# Patient Record
Sex: FEMALE | Race: ASIAN | NOT HISPANIC OR LATINO | URBAN - METROPOLITAN AREA
[De-identification: names, ages, dates, MRNs, and addresses within clinical notes are randomized per-mention and may not be internally consistent; named-entity substitution may affect disease eponyms.]

---

## 2019-12-18 ENCOUNTER — INPATIENT (INPATIENT)
Facility: HOSPITAL | Age: 39
LOS: 2 days | Discharge: HOME | End: 2019-12-21
Attending: OBSTETRICS & GYNECOLOGY | Admitting: OBSTETRICS & GYNECOLOGY

## 2019-12-18 VITALS
TEMPERATURE: 98 F | HEART RATE: 77 BPM | SYSTOLIC BLOOD PRESSURE: 119 MMHG | DIASTOLIC BLOOD PRESSURE: 70 MMHG | RESPIRATION RATE: 18 BRPM

## 2019-12-18 DIAGNOSIS — O26.899 OTHER SPECIFIED PREGNANCY RELATED CONDITIONS, UNSPECIFIED TRIMESTER: ICD-10-CM

## 2019-12-18 DIAGNOSIS — Z3A.00 WEEKS OF GESTATION OF PREGNANCY NOT SPECIFIED: ICD-10-CM

## 2019-12-18 NOTE — OB PROVIDER TRIAGE NOTE - NSHPPHYSICALEXAM_GEN_ALL_CORE
Vital Signs Last 24 Hrs  T(C): 36.5 (18 Dec 2019 21:56), Max: 36.5 (18 Dec 2019 21:56)  T(F): 97.7 (18 Dec 2019 21:56), Max: 97.7 (18 Dec 2019 21:56)  HR: 84 (18 Dec 2019 23:39) (77 - 84)  BP: 112/60 (18 Dec 2019 23:39) (112/60 - 119/70)  RR: 18 (18 Dec 2019 21:56) (18 - 18)    Assessment reveals VSS  A&Ox3  Abdomen soft, NT, Gravid   VE: 4/80/-2, intact  Cat 1 tracing, ctx q4-5mins

## 2019-12-18 NOTE — OB PROVIDER TRIAGE NOTE - HISTORY OF PRESENT ILLNESS
38y/o  @38.2wks presents with painful ctx since this morning. Pain scale 8/10.   Reports good fetal movement  Denies LOF/VB    Allergies: Denies  Medications: PNV    Denies Medical and Surgical HX  Denies Psy/Etoh/Smoke/Drugs

## 2019-12-19 ENCOUNTER — TRANSCRIPTION ENCOUNTER (OUTPATIENT)
Age: 39
End: 2019-12-19

## 2019-12-19 LAB
BASOPHILS # BLD AUTO: 0.02 K/UL — SIGNIFICANT CHANGE UP (ref 0–0.2)
BASOPHILS NFR BLD AUTO: 0.1 % — SIGNIFICANT CHANGE UP (ref 0–2)
BLD GP AB SCN SERPL QL: NEGATIVE — SIGNIFICANT CHANGE UP
EOSINOPHIL # BLD AUTO: 0.02 K/UL — SIGNIFICANT CHANGE UP (ref 0–0.5)
EOSINOPHIL NFR BLD AUTO: 0.1 % — SIGNIFICANT CHANGE UP (ref 0–6)
GLUCOSE BLDC GLUCOMTR-MCNC: 106 MG/DL — HIGH (ref 70–99)
GLUCOSE BLDC GLUCOMTR-MCNC: 89 MG/DL — SIGNIFICANT CHANGE UP (ref 70–99)
GLUCOSE BLDC GLUCOMTR-MCNC: 90 MG/DL — SIGNIFICANT CHANGE UP (ref 70–99)
HCT VFR BLD CALC: 29.4 % — LOW (ref 34.5–45)
HCT VFR BLD CALC: 42.3 % — SIGNIFICANT CHANGE UP (ref 34.5–45)
HGB BLD-MCNC: 14.2 G/DL — SIGNIFICANT CHANGE UP (ref 11.5–15.5)
HGB BLD-MCNC: 9.9 G/DL — LOW (ref 11.5–15.5)
IMM GRANULOCYTES NFR BLD AUTO: 0.7 % — SIGNIFICANT CHANGE UP (ref 0–1.5)
LYMPHOCYTES # BLD AUTO: 0.99 K/UL — LOW (ref 1–3.3)
LYMPHOCYTES # BLD AUTO: 7.1 % — LOW (ref 13–44)
MCHC RBC-ENTMCNC: 32.4 PG — SIGNIFICANT CHANGE UP (ref 27–34)
MCHC RBC-ENTMCNC: 32.5 PG — SIGNIFICANT CHANGE UP (ref 27–34)
MCHC RBC-ENTMCNC: 33.6 % — SIGNIFICANT CHANGE UP (ref 32–36)
MCHC RBC-ENTMCNC: 33.7 % — SIGNIFICANT CHANGE UP (ref 32–36)
MCV RBC AUTO: 96.4 FL — SIGNIFICANT CHANGE UP (ref 80–100)
MCV RBC AUTO: 96.6 FL — SIGNIFICANT CHANGE UP (ref 80–100)
MONOCYTES # BLD AUTO: 0.75 K/UL — SIGNIFICANT CHANGE UP (ref 0–0.9)
MONOCYTES NFR BLD AUTO: 5.4 % — SIGNIFICANT CHANGE UP (ref 2–14)
NEUTROPHILS # BLD AUTO: 12.1 K/UL — HIGH (ref 1.8–7.4)
NEUTROPHILS NFR BLD AUTO: 86.6 % — HIGH (ref 43–77)
NRBC # FLD: 0 K/UL — SIGNIFICANT CHANGE UP (ref 0–0)
NRBC # FLD: 0 K/UL — SIGNIFICANT CHANGE UP (ref 0–0)
PLATELET # BLD AUTO: 146 K/UL — LOW (ref 150–400)
PLATELET # BLD AUTO: 204 K/UL — SIGNIFICANT CHANGE UP (ref 150–400)
PMV BLD: 10.9 FL — SIGNIFICANT CHANGE UP (ref 7–13)
PMV BLD: 11.5 FL — SIGNIFICANT CHANGE UP (ref 7–13)
RBC # BLD: 3.05 M/UL — LOW (ref 3.8–5.2)
RBC # BLD: 4.38 M/UL — SIGNIFICANT CHANGE UP (ref 3.8–5.2)
RBC # FLD: 12.9 % — SIGNIFICANT CHANGE UP (ref 10.3–14.5)
RBC # FLD: 13.2 % — SIGNIFICANT CHANGE UP (ref 10.3–14.5)
RH IG SCN BLD-IMP: POSITIVE — SIGNIFICANT CHANGE UP
RH IG SCN BLD-IMP: POSITIVE — SIGNIFICANT CHANGE UP
T PALLIDUM AB TITR SER: NEGATIVE — SIGNIFICANT CHANGE UP
WBC # BLD: 13.98 K/UL — HIGH (ref 3.8–10.5)
WBC # BLD: 19.12 K/UL — HIGH (ref 3.8–10.5)
WBC # FLD AUTO: 13.98 K/UL — HIGH (ref 3.8–10.5)
WBC # FLD AUTO: 19.12 K/UL — HIGH (ref 3.8–10.5)

## 2019-12-19 RX ORDER — SODIUM CHLORIDE 9 MG/ML
1000 INJECTION, SOLUTION INTRAVENOUS
Refills: 0 | Status: COMPLETED | OUTPATIENT
Start: 2019-12-19 | End: 2019-12-19

## 2019-12-19 RX ORDER — ERTAPENEM SODIUM 1 G/1
1000 INJECTION, POWDER, LYOPHILIZED, FOR SOLUTION INTRAMUSCULAR; INTRAVENOUS ONCE
Refills: 0 | Status: COMPLETED | OUTPATIENT
Start: 2019-12-19 | End: 2019-12-19

## 2019-12-19 RX ORDER — OXYCODONE HYDROCHLORIDE 5 MG/1
5 TABLET ORAL ONCE
Refills: 0 | Status: DISCONTINUED | OUTPATIENT
Start: 2019-12-19 | End: 2019-12-21

## 2019-12-19 RX ORDER — DIPHENHYDRAMINE HCL 50 MG
25 CAPSULE ORAL EVERY 6 HOURS
Refills: 0 | Status: DISCONTINUED | OUTPATIENT
Start: 2019-12-19 | End: 2019-12-21

## 2019-12-19 RX ORDER — AER TRAVELER 0.5 G/1
1 SOLUTION RECTAL; TOPICAL EVERY 4 HOURS
Refills: 0 | Status: DISCONTINUED | OUTPATIENT
Start: 2019-12-19 | End: 2019-12-21

## 2019-12-19 RX ORDER — OXYCODONE HYDROCHLORIDE 5 MG/1
5 TABLET ORAL
Refills: 0 | Status: DISCONTINUED | OUTPATIENT
Start: 2019-12-19 | End: 2019-12-21

## 2019-12-19 RX ORDER — GLYCERIN ADULT
1 SUPPOSITORY, RECTAL RECTAL AT BEDTIME
Refills: 0 | Status: DISCONTINUED | OUTPATIENT
Start: 2019-12-19 | End: 2019-12-20

## 2019-12-19 RX ORDER — SODIUM CHLORIDE 9 MG/ML
1000 INJECTION INTRAMUSCULAR; INTRAVENOUS; SUBCUTANEOUS
Refills: 0 | Status: DISCONTINUED | OUTPATIENT
Start: 2019-12-19 | End: 2019-12-19

## 2019-12-19 RX ORDER — ACETAMINOPHEN 500 MG
3 TABLET ORAL
Qty: 0 | Refills: 0 | DISCHARGE
Start: 2019-12-19

## 2019-12-19 RX ORDER — SIMETHICONE 80 MG/1
80 TABLET, CHEWABLE ORAL EVERY 4 HOURS
Refills: 0 | Status: DISCONTINUED | OUTPATIENT
Start: 2019-12-19 | End: 2019-12-21

## 2019-12-19 RX ORDER — ACETAMINOPHEN 500 MG
975 TABLET ORAL
Refills: 0 | Status: DISCONTINUED | OUTPATIENT
Start: 2019-12-19 | End: 2019-12-21

## 2019-12-19 RX ORDER — SODIUM CHLORIDE 9 MG/ML
3 INJECTION INTRAMUSCULAR; INTRAVENOUS; SUBCUTANEOUS EVERY 8 HOURS
Refills: 0 | Status: DISCONTINUED | OUTPATIENT
Start: 2019-12-19 | End: 2019-12-21

## 2019-12-19 RX ORDER — PRAMOXINE HYDROCHLORIDE 150 MG/15G
1 AEROSOL, FOAM RECTAL EVERY 4 HOURS
Refills: 0 | Status: DISCONTINUED | OUTPATIENT
Start: 2019-12-19 | End: 2019-12-21

## 2019-12-19 RX ORDER — LANOLIN
1 OINTMENT (GRAM) TOPICAL EVERY 6 HOURS
Refills: 0 | Status: DISCONTINUED | OUTPATIENT
Start: 2019-12-19 | End: 2019-12-21

## 2019-12-19 RX ORDER — OXYTOCIN 10 UNIT/ML
10 VIAL (ML) INJECTION ONCE
Refills: 0 | Status: COMPLETED | OUTPATIENT
Start: 2019-12-19 | End: 2019-12-19

## 2019-12-19 RX ORDER — DIBUCAINE 1 %
1 OINTMENT (GRAM) RECTAL EVERY 6 HOURS
Refills: 0 | Status: DISCONTINUED | OUTPATIENT
Start: 2019-12-19 | End: 2019-12-21

## 2019-12-19 RX ORDER — SODIUM CHLORIDE 9 MG/ML
1000 INJECTION, SOLUTION INTRAVENOUS
Refills: 0 | Status: DISCONTINUED | OUTPATIENT
Start: 2019-12-19 | End: 2019-12-19

## 2019-12-19 RX ORDER — HYDROCORTISONE 1 %
1 OINTMENT (GRAM) TOPICAL EVERY 6 HOURS
Refills: 0 | Status: DISCONTINUED | OUTPATIENT
Start: 2019-12-19 | End: 2019-12-21

## 2019-12-19 RX ORDER — SODIUM CHLORIDE 9 MG/ML
1000 INJECTION INTRAMUSCULAR; INTRAVENOUS; SUBCUTANEOUS
Refills: 0 | Status: COMPLETED | OUTPATIENT
Start: 2019-12-19 | End: 2019-12-19

## 2019-12-19 RX ORDER — BENZOCAINE 10 %
1 GEL (GRAM) MUCOUS MEMBRANE EVERY 6 HOURS
Refills: 0 | Status: DISCONTINUED | OUTPATIENT
Start: 2019-12-19 | End: 2019-12-21

## 2019-12-19 RX ORDER — OXYTOCIN 10 UNIT/ML
333.33 VIAL (ML) INJECTION
Qty: 20 | Refills: 0 | Status: COMPLETED | OUTPATIENT
Start: 2019-12-19 | End: 2019-12-19

## 2019-12-19 RX ORDER — CITRIC ACID/SODIUM CITRATE 300-500 MG
15 SOLUTION, ORAL ORAL EVERY 6 HOURS
Refills: 0 | Status: DISCONTINUED | OUTPATIENT
Start: 2019-12-19 | End: 2019-12-19

## 2019-12-19 RX ORDER — MAGNESIUM HYDROXIDE 400 MG/1
30 TABLET, CHEWABLE ORAL
Refills: 0 | Status: DISCONTINUED | OUTPATIENT
Start: 2019-12-19 | End: 2019-12-21

## 2019-12-19 RX ORDER — OXYTOCIN 10 UNIT/ML
2 VIAL (ML) INJECTION
Qty: 30 | Refills: 0 | Status: DISCONTINUED | OUTPATIENT
Start: 2019-12-19 | End: 2019-12-19

## 2019-12-19 RX ORDER — TETANUS TOXOID, REDUCED DIPHTHERIA TOXOID AND ACELLULAR PERTUSSIS VACCINE, ADSORBED 5; 2.5; 8; 8; 2.5 [IU]/.5ML; [IU]/.5ML; UG/.5ML; UG/.5ML; UG/.5ML
0.5 SUSPENSION INTRAMUSCULAR ONCE
Refills: 0 | Status: DISCONTINUED | OUTPATIENT
Start: 2019-12-19 | End: 2019-12-21

## 2019-12-19 RX ADMIN — SODIUM CHLORIDE 125 MILLILITER(S): 9 INJECTION, SOLUTION INTRAVENOUS at 00:38

## 2019-12-19 RX ADMIN — Medication 975 MILLIGRAM(S): at 21:55

## 2019-12-19 RX ADMIN — SODIUM CHLORIDE 3 MILLILITER(S): 9 INJECTION INTRAMUSCULAR; INTRAVENOUS; SUBCUTANEOUS at 21:41

## 2019-12-19 RX ADMIN — Medication 1000 MILLIUNIT(S)/MIN: at 18:41

## 2019-12-19 RX ADMIN — SODIUM CHLORIDE 125 MILLILITER(S): 9 INJECTION INTRAMUSCULAR; INTRAVENOUS; SUBCUTANEOUS at 01:44

## 2019-12-19 RX ADMIN — Medication 10 UNIT(S): at 17:43

## 2019-12-19 RX ADMIN — ERTAPENEM SODIUM 120 MILLIGRAM(S): 1 INJECTION, POWDER, LYOPHILIZED, FOR SOLUTION INTRAMUSCULAR; INTRAVENOUS at 18:45

## 2019-12-19 RX ADMIN — SODIUM CHLORIDE 125 MILLILITER(S): 9 INJECTION, SOLUTION INTRAVENOUS at 07:04

## 2019-12-19 NOTE — CHART NOTE - NSCHARTNOTEFT_GEN_A_CORE
Patient seen after reviewing CBC               Vital Signs Last 24 Hrs  T(C): 37.0 (19 Dec 2019 16:18), Max: 37.6 (19 Dec 2019 14:46)  T(F): 98.6 (19 Dec 2019 16:18), Max: 99.68 (19 Dec 2019 14:46)  HR: 83 (19 Dec 2019 21:30) (62 - 105)  BP: 106/54 (19 Dec 2019 21:30) (93/51 - 149/75)  BP(mean): --  RR: 18 (19 Dec 2019 01:22) (18 - 18)  SpO2: 91% (19 Dec 2019 21:30) (84% - 100%)                 9.9    19.12 )-----------( 146      ( 19 Dec 2019 21:00 )             29.4       Perineum: bilateral vulvar edema, no hematomas visible from inspection  Lochia: light    d/w Dr. Lake, patient is cleared for transferred to postpartum. Patient to have CBC Coags at 6am    IRVING Concepcion PGY-4

## 2019-12-19 NOTE — OB PROVIDER DELIVERY SUMMARY - NSPROVIDERDELIVERYNOTE_OBGYN_ALL_OB_FT
Patient pushed to +2 station; however, fetal tracing with recurrent decels necessitating operative delivery. Vacuum placed with subsequent delivery of viable male infant in [place] after 3 pulls, 1 pop off. No nuchal cord. Delayed cord clamping performed. Perineum examined - 4th degree laceration noted, with minimal rectal mucosa visible. Rectal mucosa repaired with 3-0 chromic suture running fashion. Sphincter muscle repaired with 2-0 chromic suture interrupted figure of 8 stitches. Perineum repaired with 2-0 chromic in usual fashion. Hemostasis achieved.  Placenta delivered spontaneously. Vagina, cervix evaluated and no other lacerations noted.   EBL 600cc Patient pushed to +2 station; however, fetal tracing with recurrent decels necessitating operative delivery. Vacuum placed with subsequent delivery of viable male infant in [place] after 3 pulls, 1 pop off. No nuchal cord. Delayed cord clamping performed. Perineum examined - 4th degree laceration noted, with minimal rectal mucosa visible. Rectal mucosa repaired with 3-0 chromic suture running fashion and a second layer over the first was done as well in the same fashion. Sphincter muscle repaired with 2-0 chromic suture interrupted figure of 8 stitches. The vaginal mucosa repaired with 2-0 Chromic sutures in a running fashion in layers and good restoration of anatomy and hemostasis noted. The Perineum repaired with 2-0 Vycril suture.  Hemostasis achieved.  Placenta delivered spontaneously and noted intact . The Vagina and cervix were evaluated and noted no collections and all repair of lacerations noted intact. The rectum was inspected digitally and noted intact.   EBL 600cc

## 2019-12-19 NOTE — DISCHARGE NOTE OB - CARE PROVIDER_API CALL
Deloris Reardon)  Obstetrics and Gynecology  [address], [address]  [place] [address]  Phone: [phone]  Fax: [phone]  Follow Up Time:

## 2019-12-19 NOTE — OB NEONATOLOGY/PEDIATRICIAN DELIVERY SUMMARY - NSPEDSNEONOTESA_OBGYN_ALL_OB_FT
Baby is a 38.3 wk male born to a 38 y/o  mother via  with vacuum. Maternal history of GDMA1. Maternal blood type O+. Prenatal labs negative, non-reactive, and immune. GBS negative on 12/3. AROM at 10:30am on , bloody fluids. Baby born vigorous and crying spontaneously. Warmed, dried, stimulated. Apgars 9/9. EOS 0.33. Mom plans to breastfeed, would like hepB and circ.

## 2019-12-19 NOTE — PROGRESS NOTE ADULT - SUBJECTIVE AND OBJECTIVE BOX
Attending note   FHRT category 1   + Accels mod. variabilty   ctxs irregular   I d/w patient and  - Pitocin augmentation as FHRT category  1  C Jaya

## 2019-12-19 NOTE — DISCHARGE NOTE OB - PATIENT PORTAL LINK FT
You can access the FollowMyHealth Patient Portal offered by [facility] by registering at the following website: [email or web address]. By joining [employer]’s FollowMyHealth portal, you will also be able to view your health information using other applications (apps) compatible with our system.

## 2019-12-19 NOTE — DISCHARGE NOTE OB - CARE PLAN
Principal Discharge DX:	Vacuum-assisted vaginal delivery  Goal:	postpartum recovery  Assessment and plan of treatment:	labor admission, pitocin augmentation, Vacuum assisted vaginal delivery for category 2 tracing   postpartum recovery

## 2019-12-19 NOTE — DISCHARGE NOTE OB - PLAN OF CARE
postpartum recovery labor admission, pitocin augmentation, Vacuum assisted vaginal delivery for category 2 tracing   postpartum recovery

## 2019-12-19 NOTE — PROGRESS NOTE ADULT - SUBJECTIVE AND OBJECTIVE BOX
Attending Note  Vital Signs Last 24 Hrs  T(C): 37.1 (19 Dec 2019 10:29), Max: 37.2 (19 Dec 2019 08:21)  T(F): 98.78 (19 Dec 2019 10:29), Max: 98.96 (19 Dec 2019 08:21)  HR: 67 (19 Dec 2019 10:25) (62 - 96)  BP: 96/52 (19 Dec 2019 10:25) (94/50 - 124/69)  BP(mean): --  RR: 18 (19 Dec 2019 01:22) (18 - 18)  SpO2: 99% (19 Dec 2019 10:21) (92% - 99%)    FETAL HEART RATE: area of minimal variability now improved     [place]: irregular ctxs     CERVICAL EXAM:7-8 cm 100 -1 cervix     PAIN SCALE (0-10):comfortable       Assessment/ plan   AROM - bloody fluid d/w patient and  - plan to observe tracing and if improved to continue labor and augment if no improvement for  delivery             C Jaya

## 2019-12-19 NOTE — DISCHARGE NOTE OB - MATERIALS PROVIDED
Vaccinations/Shaken Baby Prevention Handout/  Immunization Record/Breastfeeding Guide and Packet/Birth Certificate Instructions/Guide to Postpartum Care/Discharge Medication Information for Patients and Families Pocket Guide

## 2019-12-19 NOTE — OB RN PATIENT PROFILE - NS_PRENATALLOC_OBGYN_ALL_OB
MD Office Transposition Flap Text: The defect edges were debeveled with a #15 scalpel blade.  Given the location of the defect and the proximity to free margins a transposition flap was deemed most appropriate.  Using a sterile surgical marker, an appropriate transposition flap was drawn incorporating the defect.    The area thus outlined was incised deep to adipose tissue with a #15 scalpel blade.  The skin margins were undermined to an appropriate distance in all directions utilizing iris scissors.

## 2019-12-19 NOTE — DISCHARGE NOTE OB - ADDITIONAL INSTRUCTIONS
If severe pain, heavy vaginal bleeding, fever, shortness of breath, chest pain, problem with perineal laceration repair ( stitches ) or any issues call doctor or return to the hospital

## 2019-12-19 NOTE — DISCHARGE NOTE OB - HOSPITAL COURSE
40y/o  @38.2wks presented with painful ctxs and admitted for labor and delivery management.  Labor admission, pitocin augmentation, Vacuum assisted vaginal delivery for category 2 tracing- male infant    4th degree perineal lacerationn- repaired in layers with good restoration of anatomy and hemostasis. Invanz IV given x 1   postpartum recovery

## 2019-12-19 NOTE — DISCHARGE NOTE OB - MEDICATION SUMMARY - MEDICATIONS TO TAKE
I will START or STAY ON the medications listed below when I get home from the hospital:    acetaminophen 325 mg oral tablet  -- 3 tab(s) by mouth   -- Indication: For for oain    witch hazel 50% topical pad  -- 1 application on skin every 4 hours, As needed, Perineal discomfort  -- Indication: For for perineal laceration    benzocaine 20% topical spray  -- 1 spray(s) on skin every 6 hours, As needed, for Perineal discomfort  -- Indication: For for perineal laceration    dibucaine 1% topical ointment  -- 1 application on skin every 6 hours, As needed, Perineal discomfort  -- Indication: For for perinal lacerartion    PNV Prenatal oral tablet  -- 1 tab(s) by mouth once a day  -- Indication: For for breastfeeding support    senna oral tablet  -- 2 tab(s) by mouth once a day (at bedtime)  -- Indication: For to prevent constipation, stop if you have diarrhea

## 2019-12-19 NOTE — PROGRESS NOTE ADULT - SUBJECTIVE AND OBJECTIVE BOX
Attending Note      Vital Signs Last 24 Hrs  T(C): 37.0 (19 Dec 2019 14:26), Max: 37.2 (19 Dec 2019 08:21)  T(F): 98.6 (19 Dec 2019 14:26), Max: 98.96 (19 Dec 2019 08:21)  HR: 77 (19 Dec 2019 14:40) (62 - 96)  BP: 110/60 (19 Dec 2019 14:40) (94/50 - 124/69)  BP(mean): --  RR: 18 (19 Dec 2019 01:22) (18 - 18)  SpO2: 94% (19 Dec 2019 14:51) (92% - 100%)    FETAL HEART RATE:  fhrt with decel and tachycardia - temp rectal 37.6  [place]:    CERVICAL EXAM:rim /0 station to +1     PAIN SCALE (0-10):7/10      Assessment/ plan   RLP and oxygen with recovery   [place] continue labor     C Jaya

## 2019-12-19 NOTE — OB RN DELIVERY SUMMARY - NS_SEPSISRSKCALC_OBGYN_ALL_OB_FT
EOS calculated successfully. EOS Risk Factor: 0.5/1000 live births ([facility] national incidence); GA=38w3d; Temp=99.68; ROM=6.833; GBS='Negative'; Antibiotics='No antibiotics or any antibiotics < 2 hrs prior to birth'

## 2019-12-19 NOTE — CHART NOTE - NSCHARTNOTEFT_GEN_A_CORE
Patient assessed at bedside for cervical change.  She is comfortable at this time.    VS  T(C): 36.8 (12-19-19 @ 04:20)  HR: 81 (12-19-19 @ 04:54)  BP: 94/50 (12-19-19 @ 04:54)  RR: 18 (12-19-19 @ 01:22)  SpO2: 95% (12-19-19 @ 04:51)    SVE: 6/80/-3  EFM: 140, mod, +accels, -decels  [place]: q4min    Plan:  -expectant    Dr. Mccoy to be contacted  Rmaana Lawrence, PGY1

## 2019-12-20 DIAGNOSIS — Z37.9 OUTCOME OF DELIVERY, UNSPECIFIED: ICD-10-CM

## 2019-12-20 LAB
HCT VFR BLD CALC: 28.5 % — LOW (ref 34.5–45)
HGB BLD-MCNC: 9.5 G/DL — LOW (ref 11.5–15.5)
MCHC RBC-ENTMCNC: 32.1 PG — SIGNIFICANT CHANGE UP (ref 27–34)
MCHC RBC-ENTMCNC: 33.3 % — SIGNIFICANT CHANGE UP (ref 32–36)
MCV RBC AUTO: 96.3 FL — SIGNIFICANT CHANGE UP (ref 80–100)
NRBC # FLD: 0 K/UL — SIGNIFICANT CHANGE UP (ref 0–0)
PLATELET # BLD AUTO: 165 K/UL — SIGNIFICANT CHANGE UP (ref 150–400)
PMV BLD: 11.2 FL — SIGNIFICANT CHANGE UP (ref 7–13)
RBC # BLD: 2.96 M/UL — LOW (ref 3.8–5.2)
RBC # FLD: 13.2 % — SIGNIFICANT CHANGE UP (ref 10.3–14.5)
WBC # BLD: 19.19 K/UL — HIGH (ref 3.8–10.5)
WBC # FLD AUTO: 19.19 K/UL — HIGH (ref 3.8–10.5)

## 2019-12-20 RX ORDER — DIBUCAINE 1 %
1 OINTMENT (GRAM) RECTAL
Qty: 0 | Refills: 0 | DISCHARGE
Start: 2019-12-20

## 2019-12-20 RX ORDER — ERGOCALCIFEROL 1.25 MG/1
1 CAPSULE ORAL
Qty: 0 | Refills: 0 | DISCHARGE

## 2019-12-20 RX ORDER — SENNA PLUS 8.6 MG/1
2 TABLET ORAL
Qty: 0 | Refills: 0 | DISCHARGE
Start: 2019-12-20

## 2019-12-20 RX ORDER — AER TRAVELER 0.5 G/1
1 SOLUTION RECTAL; TOPICAL
Qty: 0 | Refills: 0 | DISCHARGE
Start: 2019-12-20

## 2019-12-20 RX ORDER — BENZOCAINE 10 %
1 GEL (GRAM) MUCOUS MEMBRANE
Qty: 0 | Refills: 0 | DISCHARGE
Start: 2019-12-20

## 2019-12-20 RX ORDER — SENNA PLUS 8.6 MG/1
2 TABLET ORAL AT BEDTIME
Refills: 0 | Status: DISCONTINUED | OUTPATIENT
Start: 2019-12-20 | End: 2019-12-21

## 2019-12-20 RX ORDER — SODIUM CHLORIDE 9 MG/ML
500 INJECTION, SOLUTION INTRAVENOUS ONCE
Refills: 0 | Status: COMPLETED | OUTPATIENT
Start: 2019-12-20 | End: 2019-12-20

## 2019-12-20 RX ADMIN — SODIUM CHLORIDE 3 MILLILITER(S): 9 INJECTION INTRAMUSCULAR; INTRAVENOUS; SUBCUTANEOUS at 15:11

## 2019-12-20 RX ADMIN — Medication 975 MILLIGRAM(S): at 12:35

## 2019-12-20 RX ADMIN — Medication 975 MILLIGRAM(S): at 18:12

## 2019-12-20 RX ADMIN — Medication 975 MILLIGRAM(S): at 05:16

## 2019-12-20 RX ADMIN — Medication 975 MILLIGRAM(S): at 05:51

## 2019-12-20 RX ADMIN — Medication 1 TABLET(S): at 12:01

## 2019-12-20 RX ADMIN — SODIUM CHLORIDE 500 MILLILITER(S): 9 INJECTION, SOLUTION INTRAVENOUS at 23:03

## 2019-12-20 RX ADMIN — SODIUM CHLORIDE 3 MILLILITER(S): 9 INJECTION INTRAMUSCULAR; INTRAVENOUS; SUBCUTANEOUS at 05:11

## 2019-12-20 RX ADMIN — Medication 975 MILLIGRAM(S): at 12:01

## 2019-12-20 RX ADMIN — SODIUM CHLORIDE 3 MILLILITER(S): 9 INJECTION INTRAMUSCULAR; INTRAVENOUS; SUBCUTANEOUS at 23:02

## 2019-12-20 NOTE — PROGRESS NOTE ADULT - ASSESSMENT
38yo PPD#1 s/p VAVD c/b 4th deg laceration s/p Invanz x1. . Pt asymptomatic at this time from acute blood loss anemia, doing well postpartum.

## 2019-12-20 NOTE — PROGRESS NOTE ADULT - PROBLEM SELECTOR PLAN 1
- Monitor VS  - Routine perineal care  - Pain well controlled, continue current pain regimen  - Increase ambulation as tolerated  - Continue regular diet    Cata Patel, PGY-1  Pager#[phone] - Monitor VS  - Pain well controlled, continue current pain regimen  - Increase ambulation as tolerated  - Low res diet  - Standing senna  - Isabelle baths  - Nothing W[place]    Cata Patel, PGY-1  Pager#[phone]

## 2019-12-20 NOTE — PROGRESS NOTE ADULT - ATTENDING COMMENTS
Attending Note     PPD 1 s/p VAVD c/w 4th degree laceration and      Pt reports feeling ok, pain is adeuqate  no had BM   able to hold gas, no fecal incontinence  ambulating with no issues   voiding freely     AFVSS  Gen in NAD   Abd soft, nontender, fundus firm   Ext: no c/c/e  Perineum swollen, healing well, perineum skin reapproximated    hgb 14.2/600/9.9/9.2    A/P; PPD 1 s/p VAVD c/w 4th degree laceration s/p invanz x 1,    doing well   continue with stool softeners  continue with pain meds  sitz baths   I had 30 minute conversation with patient and patient's  regarding care of the perineum, all questions answered  pt requests circ, pending clearance  anticipate d/c home in AM     Mary Ray MD MSc

## 2019-12-20 NOTE — LACTATION INITIAL EVALUATION - LIVING CHILDREN, OB PROFILE
Patient Education     Pharyngitis (Sore Throat), Report Pending    Pharyngitis (sore throat) is often due to a virus. It can also be caused by streptococcus (strep), bacteria. This is often called strep throat. Both viral and strep infections can cause throat pain that is worse when swallowing, aching all over, headache, and fever. Both types of infections are contagious. They may be spread by coughing, kissing, or touching others after touching your mouth or nose.  A test has been done to find out if you or your child have strep throat. Call this facility or your healthcare provider if you were not given your test results. If the test is positive for strep infection, you will need to take antibiotic medicines. A prescription can be called into your pharmacy at that time. If the test is negative, you probably have a viral pharyngitis. This does not need to be treated with antibiotics. Until you receive the results of the strep test, you should stay home from work. If your child is being tested, he or she should stay home from school.  Home care  · Rest at home. Drink plenty of fluids so you won't get dehydrated.  · If the test is positive for strep, you or your child should not go to work or school for the first 2 days of taking the antibiotics. After this time, you or your child will not be contagious. You or your child can then return to work or school when feeling better.   · Use the antibiotic medicine for the full 10 days. Do not stop the medicine even if you or your child feel better. This is very important to make sure the infection is fully treated. It is also important to prevent medicine-resistant germs from growing. If you or your child were given an antibiotic shot, no more antibiotics are needed.  · Use throat lozenges or numbing throat sprays to help reduce pain. Gargling with warm salt water will also help reduce throat pain. Dissolve 1/2 teaspoon of salt in 1 glass of warm water. Children can sip  on juice or a popsicle. Children 5 years and older can also suck on a lollipop or hard candy.  · Don't eat salty or spicy foods or give them to your child. These can irritate the throat.  Other medicine for a child: You can give your child acetaminophen for fever, fussiness, or discomfort. In babies over 6 months of age, you may use ibuprofen instead of acetaminophen. If your child has chronic liver or kidney disease or ever had a stomach ulcer or GI bleeding, talk with your child’s healthcare provider before giving these medicines. Aspirin should never be used by any child under 18 years of age who has a fever. It may cause severe liver damage.  Other medicine for an adult: You may use acetaminophen or ibuprofen to control pain or fever, unless another medicine was prescribed for this. If you have chronic liver or kidney disease or ever had a stomach ulcer or GI bleeding, talk with your healthcare provider before using these medicines.  Follow-up care  Follow up with your healthcare provider or our staff if you or your child don't get better over the next week.  When to seek medical advice  Call your healthcare provider right away if any of these occur:  · Fever as directed by your healthcare provider. For children, seek care if:  ? Your child is of any age and has repeated fevers above 104°F (40°C).  ? Your child is younger than 2 years of age and has a fever of 100.4°F (38°C) for more than 1 day.  ? Your child is 2 years old or older and has a fever of 100.4°F (38°C) for more than 3 days.  · New or worsening ear pain, sinus pain, or headache  · Painful lumps in the back of neck  · Stiff neck  · Lymph nodes are getting larger  · •Can’t swallow liquids, a lot of drooling, or can’t open mouth wide due to throat pain  · Signs of dehydration, such as very dark urine or no urine, sunken eyes, dizziness  · Trouble breathing or noisy breathing  · Muffled voice  · New rash  · Other symptoms getting worse  Prevention  Here  are steps you can take to help prevent an infection:  · Keep good hand washing habits.  · Don’t have close contact with people who have sore throats, colds, or other upper respiratory infections.  · Don’t smoke, and stay away from secondhand smoke.  · Stay up to date with of your vaccines.  Date Last Reviewed: 11/1/2017 © 2000-2018 [employer]. [address] [place] [address]. All rights reserved. This information is not intended as a substitute for professional medical care. Always follow your healthcare professional's instructions.           Patient Education     Viral Upper Respiratory Illness (Adult)  You have a viral upper respiratory illness (URI), which is another term for the common cold. This illness is contagious during the first few days. It is spread through the air by coughing and sneezing. It may also be spread by direct contact (touching the sick person and then touching your own eyes, nose, or mouth). Frequent handwashing will decrease risk of spread. Most viral illnesses go away within 7 to 10 days with rest and simple home remedies. Sometimes the illness may last for several weeks. Antibiotics will not kill a virus, and they are generally not prescribed for this condition.    Home care  · If symptoms are severe, rest at home for the first 2 to 3 days. When you resume activity, don't let yourself get too tired.  · Avoid being exposed to cigarette smoke (yours or others’).  · You may use acetaminophen or ibuprofen to control pain and fever, unless another medicine was prescribed. If you have chronic liver or kidney disease, have ever had a stomach ulcer or gastrointestinal bleeding, or are taking blood-thinning medicines, talk with your healthcare provider before using these medicines. Aspirin should never be given to anyone under 18 years of age who is ill with a viral infection or fever. It may cause severe liver or brain damage.  · Your appetite may be poor, so a light  diet is fine. Avoid dehydration by drinking 6 to 8 glasses of fluids per day (water, soft drinks, juices, tea, or soup). Extra fluids will help loosen secretions in the nose and lungs.  · Over-the-counter cold medicines will not shorten the length of time you’re sick, but they may be helpful for the following symptoms: cough, sore throat, and nasal and sinus congestion. (Note: Do not use decongestants if you have high blood pressure.)  Follow-up care  Follow up with your healthcare provider, or as advised.  When to seek medical advice  Call your healthcare provider right away if any of these occur:  · Cough with lots of colored sputum (mucus)  · Severe headache; face, neck, or ear pain  · Difficulty swallowing due to throat pain  · Fever of 100.4°F (38°C) or higher, or as directed by your healthcare provider  Call 911  Call 911 if any of these occur:  · Chest pain, shortness of breath, wheezing, or difficulty breathing  · Coughing up blood  · Inability to swallow due to throat pain  Date Last Reviewed: 9/13/2015  © 5107-8171 [employer]. [address] [place] [address]. All rights reserved. This information is not intended as a substitute for professional medical care. Always follow your healthcare professional's instructions.            0

## 2019-12-20 NOTE — PROGRESS NOTE ADULT - SUBJECTIVE AND OBJECTIVE BOX
OB Progress Note: VAVD PPD#1    S: 40yo PPD#1 s/p VAVD. Patient feels well. Pain is well controlled. She is tolerating a regular diet, voiding spontaneously, and ambulating without difficulty. Denies CP/SOB. Denies lightheadedness/dizziness. Denies N/V.     O:  Vital Signs Last 24 Hrs  T(C): 36.8 (20 Dec 2019 07:08), Max: 37.6 (19 Dec 2019 14:46)  HR: 70 (20 Dec 2019 07:08) (62 - 105)  BP: 97/54 (20 Dec 2019 07:08) (92/51 - 149/75)  RR: 17 (20 Dec 2019 07:08) (17 - 18)  SpO2: 100% (20 Dec 2019 07:08) (84% - 100%)    MEDICATIONS  (STANDING):  acetaminophen   Tablet .. 975 milliGRAM(s) Oral <User Schedule>  diphtheria/tetanus/pertussis (acellular) Vaccine (ADAcel) 0.5 milliLiter(s) IntraMuscular once  prenatal multivitamin 1 Tablet(s) Oral daily  sodium chloride 0.9% lock flush 3 milliLiter(s) IV Push every 8 hours    Labs:  Blood type: O Positive  Rubella IgG: RPR: Negative                          9.5<L>   19.19<H> >-----------< 165    ( 12-20 @ 06:30 )             28.5<L>                        9.9<L>   19.12<H> >-----------< 146<L>    ( 12-19 @ 21:00 )             29.4<L>                        14.2   13.98<H> >-----------< 204    ( 12-19 @ 00:30 )             42.3      Physical Exam:  General: NAD  Abdomen: soft, non-tender, non-distended, fundus firm  Vaginal: Lochia wnl  Extremities: No erythema/edema

## 2019-12-20 NOTE — PROVIDER CONTACT NOTE (OTHER) - ACTION/TREATMENT ORDERED:
KRISTIE Wallace made aware of patients vital signs and symptoms. New order for LR 500ml bolus x 1. Order carried out.
ice pack to mid back area

## 2019-12-20 NOTE — LACTATION INITIAL EVALUATION - LACTATION INTERVENTIONS
initiate hand expression routine/initiate skin to skin/Instructed and assisted with positioning to facilitate proper latch.  Infant greater than 24 hours of age, sleepy.  Breastfeeding strategies demonstrated to increase effectiveness of the feeding.  Instructed to feed at least 8 or more times in 24 hours , or with early hunger cues.  Reviewed feeding log and taught hand expression.  Discussed outpatient resources , warm line, breastfeeding support group.  Primary RN made aware of consult and need for further assistance and assessment at this time.

## 2019-12-20 NOTE — PROVIDER CONTACT NOTE (OTHER) - ASSESSMENT
Patient denies feeling dizzy or lightheaded. Fundus firm, midline. Lochia light.
Redness on upper back, no laceration or bleeding, no other bodily trauma. Pt denies lightheadedness, dizziness, loss of consciousness. B/P 100/59, P 99, T 98.2, O2 % R/A

## 2019-12-20 NOTE — PROVIDER CONTACT NOTE (FALL NOTIFICATION) - SITUATION
Pt in bathroom changing and attempted to  the sanitary pad; she then lost her balance and fell back onto her bottom and hit her back on the shower wall. Pt assisted back to bed.

## 2019-12-20 NOTE — CHART NOTE - NSCHARTNOTEFT_GEN_A_CORE
Patient is 39y  old.    Event : fall    Vital Signs Last 24 Hrs  T(C): 36.9 (20 Dec 2019 10:37), Max: 36.9 (20 Dec 2019 10:37)  T(F): 98.4 (20 Dec 2019 10:37), Max: 98.4 (20 Dec 2019 10:37)  HR: 80 (20 Dec 2019 10:37) (64 - 99)  BP: 95/52 (20 Dec 2019 10:37) (92/51 - 119/66)  BP(mean): --  RR: 17 (20 Dec 2019 10:37) (17 - 18)  SpO2: 100% (20 Dec 2019 10:37) (89% - 100%)    I&O's Detail    19 Dec 2019 07:01  -  20 Dec 2019 07:00  --------------------------------------------------------  IN:    dextrose 5% + sodium chloride 0.9%: 875 mL    sodium chloride 0.9%: 300 mL  Total IN: 1175 mL    OUT:    Estimated Blood Loss: 600 mL    Indwelling Catheter - Urethral: 1100 mL    Intermittent Catheterization - Urethral: 400 mL    Voided: 1375 mL  Total OUT: 3475 mL    Total NET: -2300 mL      Labs:                        9.5    19.19 )-----------( 165      ( 20 Dec 2019 06:30 )             28.5                         9.9    19.12 )-----------( 146      ( 19 Dec 2019 21:00 )             29.4                         14.2   13.98 )-----------( 204      ( 19 Dec 2019 00:30 )             42  Evaluation :    s/p NVD PPD#1 called to see patient due to fall in the bathroom.  Stated when she squat down to put the underwear and pad she fall back and sat down hit her back on the shower wall. she does not feel any lightheadedness, SOB, CP, back pain or any other discomforts due to fall.  On examination slight redness noted on the L side of the upper back and middle of the back, no tenderness. other than back she did not hit anywhere else. vitals stable      Management and Follow-up plan :  Discussed with DR Mccoy  Ice pack to reddened area  will continue to watch her closely            ------------------------------------------------------------------------------------------------------------- Patient is 39y  old.    Event : fall    Vital Signs Last 24 Hrs  T(C): 36.9 (20 Dec 2019 10:37), Max: 36.9 (20 Dec 2019 10:37)  T(F): 98.4 (20 Dec 2019 10:37), Max: 98.4 (20 Dec 2019 10:37)  HR: 80 (20 Dec 2019 10:37) (64 - 99)  BP: 95/52 (20 Dec 2019 10:37) (92/51 - 119/66)  BP(mean): --  RR: 17 (20 Dec 2019 10:37) (17 - 18)  SpO2: 100% (20 Dec 2019 10:37) (89% - 100%)    I&O's Detail    19 Dec 2019 07:01  -  20 Dec 2019 07:00  --------------------------------------------------------  IN:    dextrose 5% + sodium chloride 0.9%: 875 mL    sodium chloride 0.9%: 300 mL  Total IN: 1175 mL    OUT:    Estimated Blood Loss: 600 mL    Indwelling Catheter - Urethral: 1100 mL    Intermittent Catheterization - Urethral: 400 mL    Voided: 1375 mL  Total OUT: 3475 mL    Total NET: -2300 mL      Labs:                        9.5    19.19 )-----------( 165      ( 20 Dec 2019 06:30 )             28.5                         9.9    19.12 )-----------( 146      ( 19 Dec 2019 21:00 )             29.4                         14.2   13.98 )-----------( 204      ( 19 Dec 2019 00:30 )             42  Evaluation :    s/p NVD PPD#1 called to see patient due to fall in the bathroom.  Stated when she squat down to put the underwear and pad she fall back and sat down hit her back on the shower wall. she does not feel any lightheadedness, SOB, CP, back pain , lose conciousness or any other discomforts due to fall.  On examination slight redness noted on the L side of the upper back and middle of the back, no tenderness. other than back she did not hit anywhere else. vitals stable      Management and Follow-up plan :  Discussed with DR Mccoy  Ice pack to reddened area  will continue to watch her closely            -------------------------------------------------------------------------------------------------------------

## 2019-12-20 NOTE — PROVIDER CONTACT NOTE (FALL NOTIFICATION) - ASSESSMENT
Redness on upper back, no laceration or bleeding, no other bodily trauma. Pt denies loss of consciousness, dizziness, lightheadedness. T 36.8, /59, HR 99, RR 17, O2sat 100%

## 2019-12-21 VITALS
DIASTOLIC BLOOD PRESSURE: 52 MMHG | SYSTOLIC BLOOD PRESSURE: 93 MMHG | TEMPERATURE: 98 F | OXYGEN SATURATION: 98 % | HEART RATE: 78 BPM | RESPIRATION RATE: 17 BRPM

## 2019-12-21 RX ADMIN — SENNA PLUS 2 TABLET(S): 8.6 TABLET ORAL at 00:22

## 2019-12-21 RX ADMIN — Medication 975 MILLIGRAM(S): at 06:55

## 2019-12-21 RX ADMIN — Medication 1 TABLET(S): at 12:59

## 2019-12-21 RX ADMIN — Medication 975 MILLIGRAM(S): at 01:10

## 2019-12-21 RX ADMIN — Medication 975 MILLIGRAM(S): at 13:00

## 2019-12-21 RX ADMIN — Medication 975 MILLIGRAM(S): at 06:18

## 2019-12-21 RX ADMIN — Medication 975 MILLIGRAM(S): at 00:23

## 2019-12-21 RX ADMIN — Medication 975 MILLIGRAM(S): at 12:59

## 2019-12-21 RX ADMIN — SODIUM CHLORIDE 3 MILLILITER(S): 9 INJECTION INTRAMUSCULAR; INTRAVENOUS; SUBCUTANEOUS at 06:10

## 2019-12-21 NOTE — PROGRESS NOTE ADULT - PROBLEM SELECTOR PLAN 1
- Monitor VS  - Low res diet  - Nothing NM  - Senna, MOM ATC  - Stiz baths, perineal care  - Pain well controlled, continue current pain regimen  - Increase ambulation as tolerated  - Discharge Planning    Cata Patel, PGY-1  Pager# [phone]

## 2019-12-21 NOTE — PROGRESS NOTE ADULT - ASSESSMENT
38yo PPD#2 s/p VAVD c/b 4th deg laceration s/p Invanz x1. . Pt asymptomatic at this time from acute blood loss anemia, doing well postpartum.

## 2019-12-21 NOTE — PROGRESS NOTE ADULT - SUBJECTIVE AND OBJECTIVE BOX
OB Progress Note: VAVD PPD#2    S: 40yo PPD#2 s/p VAVD. Overnight, patient had a fall in the bathroom. This AM, pt reports that she is feeling better after sleep and has been able to ambulate without difficulty. Patient feels well. Pain is well controlled. She is tolerating a regular diet, voiding spontaneously. Denies CP/SOB. Denies lightheadedness/dizziness. Denies N/V.     O:  Vital Signs Last 24 Hrs  T(C): 36.7 (21 Dec 2019 05:09), Max: 36.9 (20 Dec 2019 10:37)  HR: 78 (21 Dec 2019 05:09) (78 - 99)  BP: 93/52 (21 Dec 2019 05:09) (93/52 - 100/59)  RR: 17 (21 Dec 2019 05:09) (16 - 17)  SpO2: 98% (21 Dec 2019 05:09) (98% - 100%)    MEDICATIONS  (STANDING):  acetaminophen   Tablet .. 975 milliGRAM(s) Oral <User Schedule>  diphtheria/tetanus/pertussis (acellular) Vaccine (ADAcel) 0.5 milliLiter(s) IntraMuscular once  prenatal multivitamin 1 Tablet(s) Oral daily  senna 2 Tablet(s) Oral at bedtime  sodium chloride 0.9% lock flush 3 milliLiter(s) IV Push every 8 hours      Labs:  Blood type: O Positive  Rubella IgG: RPR: Negative                          9.5<L>   19.19<H> >-----------< 165    ( 12-20 @ 06:30 )             28.5<L>                        9.9<L>   19.12<H> >-----------< 146<L>    ( 12-19 @ 21:00 )             29.4<L>                        14.2   13.98<H> >-----------< 204    ( 12-19 @ 00:30 )             42.3        Physical Exam:  General: NAD  Abdomen: soft, non-tender, non-distended, fundus firm  Vaginal: Lochia wnl  Extremities: No erythema/edema

## 2020-06-22 NOTE — PROVIDER CONTACT NOTE (OTHER) - SITUATION
Family Medicine Progress note      Chief Complaint  Chief Complaint   Patient presents with   • Follow-up     2 month       History    Ruddy Leslie is a 67 year old male who presents for follow-up    Presents for follow-up of his chronic medical conditions.    Patient is a few new complaints he would like to discuss at today's visit.    Patient has numbness and tingling in bilateral hands.  First 2nd 3rd digits.  He is a .  Reports grabbing a steering well all day.  Symptoms have been ongoing for months to years.  Worsening.  Denies any significant weakness at this time.  Denies any other alleviating or aggravating factors.    Patient also like to discuss decreased hearing of left ear.  Present for years.  Worsening.  Denies any trauma.  Denies any ear pain.    Patient also like to discuss toenail fungus.  Left great toe.  Present for months.  Worsening.  Fifth yellow discolored nail.    Patient is also here for follow-up for his kidney stone.  His repeat urinalysis was negative for hematuria.  No longer having pain.        medical history    Past Medical History:   Diagnosis Date   • Anal fissure    • Brain aneurysm    • HLD (hyperlipidemia)    • HTN (hypertension)    • Kidney stone    • Onychomycosis    • Ptosis, myogenic, right    • Senile nuclear cataract, bilateral    • Shingles 1998    Left Torso   • Strabismus, paralytic 02/2000   • Tear of medial cartilage or meniscus of knee, current 3/19/2014    For left knee scope today       SURGICAL history    Past Surgical History:   Procedure Laterality Date   • Back surgery     • Brain aneurysm surgery     • Colonoscopy      x2   • Colonoscopy  7.08.13    Dr. Diaz   • Esophagogastroduodenoscopy transoral flex w/bx single or mult     • Hip surgery Left 03/28/2019   • Remove tonsils/adenoids,14+ y/o     • Service to gastroenterology     • Upper arm/elbow surgery unlisted  1999    Industrial accident   • Vasectomy  12/26/2007       social history     Social History     Tobacco Use   • Smoking status: Never Smoker   • Smokeless tobacco: Never Used   Substance Use Topics   • Alcohol use: No   • Drug use: No       family history    Family History   Problem Relation Age of Onset   • Diabetes Father    • Cancer Father         skin   • Colon Polyps Other         Multiple Family Members       mEDICATIONS    Current Outpatient Medications   Medication Sig   • atorvastatin (LIPITOR) 20 MG tablet Take 1 tablet by mouth daily.   • hydrochlorothiazide (HYDRODIURIL) 12.5 MG tablet Take 1 tablet by mouth 2 times daily.   • losartan (COZAAR) 50 MG tablet Take one tablet twice daily.   • hydroCORTisone (PROCTOZONE-HC) 2.5 % rectal cream Place rectally 2 times daily.     No current facility-administered medications for this visit.        aLLERGIES    ALLERGIES:  No Known Allergies    REVIEW OF SYSTEMS      ROS negative for consitutional, Ear/Nose/Mouth/Thorat, respiratory, cardiology, gastroenterology except noted in HPI.   Physical Exam    Vital Signs:    Vitals:    06/22/20 1522   BP: 118/84   Pulse: 88   Temp: 98 °F (36.7 °C)   TempSrc: Temporal   SpO2: 96%   Weight: 113.9 kg   Height: 5' 11\" (1.803 m)       Constitutional:  Adult male.  No acute distress.  Obese.  Eyes: Anicteric sclera. Pink conjunctiva. Pupils round and symmetrical.   Nose/Throat: No Rhinorrhea/congestion. Moist mucous membranes  Ears: Normal appearing auditory canal. Non-bulging TM without perforation.   Neck: Supple. No thyroid masses/tenderness. Midline trachea. No Lymphadenopathy  Resp: Normal effort. No wheezing. No adventitious sounds  CV: Regular rate. Regular rhythm. S1S2. No murmurs  MSK: No visible deformities.   Skin:  Thick, yellow discoloration of left great toenail  Bilateral Wrist/Hand Exam:   Inspection:  · No visible deformity. No swelling. No bruising.   Palpation:  · Distal radial tenderness  -  · Distal Ulnar tenderness  -  · Snuff box tenderness  -  · Carpal  tenderness   -  · Metacarpal tenderness  -  · P/M/D Phalange tenderness -   Range of motion:  · Wrist/finger thumb: Full flexion, extension, abduction, adduction   Specialty Tests:  · Hand  strength   N/Symmetrical  · Finkelstein's (dequarvian)  -  · Tinnel's Sign (carpal tunnel) +  · Phalen's Sign (carpal tunnel) -      Assessment & Plan      1. Bilateral carpal tunnel syndrome    2. Decreased hearing, left    3. Onychomycosis    4. IFG (impaired fasting glucose)    5. Mixed hyperlipidemia    6. Essential hypertension with goal blood pressure less than 140/90      Bilateral carpal tunnel syndrome.  In new.  Recommend nocturnal splinting for 1 month.  If not better, call and will refer patient to Orthopedics for further evaluation treatment options    Decreased hearing in left ear.  Suspect sensory neural hearing loss.  Referral placed to Cardiology.    Onychomycosis.  Reassurance provided.  For now will continue to monitor however is interested in the future, we could consider treating with antifungals.    Impaired fasting glucose.  Continue lifestyle modifications.    Hyperlipidemia.  Continue lifestyle modifications.    Hypertension.  Controlled.  Continue losartan and hydrochlorothiazide.        Recent PHQ 2/9 Score    PHQ 2:  Date Adult PHQ 2 Score Adult PHQ 2 Interpretation   6/22/2020 0 No further screening needed       PHQ 9:       DEPRESSION ASSESSMENT/PLAN:  Depression screening is negative no further plan needed.      Return in about 6 months (around 12/22/2020) for Annual visit.       Patient with positive orthostatic VS

## 2021-10-19 NOTE — OB RN PATIENT PROFILE - PRO BLOOD TYPE INFANT
Patient PO challenged. Patient tolerated, MD aware  
O positive

## 2022-01-04 NOTE — PROVIDER CONTACT NOTE (FALL NOTIFICATION) - DATE AND TIME:
Jaime Summers needs to be seen for an appointment before further refills are given.   20-Dec-2019 18:05

## 2022-10-05 NOTE — OB PROVIDER TRIAGE NOTE - NSGENETICTESTING_OBGYN_ALL_OB
Well appearing, awake, alert, oriented to person, place, time/situation and in no apparent distress. normal... No, other reason

## 2023-02-17 NOTE — PROVIDER CONTACT NOTE (OTHER) - SITUATION
[facility] Unit - Psychiatric Consultation  [facility] Emergency Department    Zully Llanes MRN: [id number]   Age: 43 year old YOB: 1979     History     Chief Complaint   Patient presents with     Mental Health Problem     Telemedicine Visit: The patient's condition can be safely assessed and treated via synchronous audio and visual telemedicine encounter.      Reason for Telemedicine Visit: Services only offered telehealth      Originating Site (Patient Location): [facility] emergency department unit    Distant Site (Provider Location): Provider Remote Setting    Consent:  The patient/guardian has verbally consented to: the potential risks and benefits of telemedicine (video visit or phone) versus in person care; bill my insurance or make self-payment for services provided; and responsibility for payment of non-covered services.     Mode of Communication: [employer], a secure HIPAA compliant video platform      HPI  Zully Llanes is a 43 year old female with history notable for depression, anxiety and alcohol use disorder, in remission who presents to the ED with worsening anxiety and depressive symptoms.  Patient was evaluated by the ED provider, who medically cleared patient to transfer to [facility] for psychiatric assessment, this is reviewed along with all pertinent labs and tests performed.    On examination, Zully states for the past 2 weeks her anxiety level has been elevated to the point she experienced a severe panic attack where she was hyperventilating, crying, feeling numb and fearful that she sought help in the ED. She identifies several contributing factors impacting her mental health. She is worried about finances and possible lay off at work. Her Zoloft dose was decreased in the fall with notable increase in anxiety symptoms that dose was increased a couple months later to 100 mg. She's taken up to 150 mg daily with adequate symptom reduction. She denies current suicidal thoughts or  "urges to end life. She is seeking additional mental health support. She does not appear psychotic although she feels somewhat \"hypervigielent\" and \"paranoid.\" Sleep is adequate with trazodone 150 mg nightly.     Past Medical History  No past medical history on file.  No past surgical history on file.  estradiol (VIVELLE-DOT) 0.1 MG/24HR bi-weekly patch  omeprazole (PRILOSEC) 20 MG DR capsule  QUEtiapine (SEROQUEL) 50 MG tablet  sertraline (ZOLOFT) 100 MG tablet  sertraline (ZOLOFT) 100 MG tablet  spironolactone (ALDACTONE) 50 MG tablet  traZODone (DESYREL) 50 MG tablet      No Known Allergies  Family History  No family history on file.  Social History           Review of Systems  A medically appropriate review of systems was performed with pertinent positives and negatives noted in the HPI, and all other systems negative.    Physical Examination   BP: (!) 224/189  Pulse: 79  Temp: 97.2  F (36.2  C)  Resp: 18  Height: 175.3 cm (5' 9\")  Weight: 63.5 kg (140 lb)  SpO2: 100 %    Physical Exam  General: Appears stated age.   Neuro: Alert and fully oriented. Extremities appear to demonstrate normal strength on visual inspection.   Integumentary/Skin: no rash visualized, normal color    Psychiatric Examination   Appearance: awake, alert  Attitude:  cooperative  Eye Contact:  good  Mood:  better  Affect:  intensity is normal  Speech:  clear, coherent  Psychomotor Behavior:  no evidence of tardive dyskinesia, dystonia, or tics  Thought Process:  logical, linear and goal oriented  Associations:  no loose associations  Thought Content:  no evidence of suicidal ideation or homicidal ideation and no evidence of psychotic thought  Insight:  good  Judgement:  intact  Oriented to:  time, person, and place  Attention Span and Concentration:  intact  Recent and Remote Memory:  intact  Language: able to name/identify objects without impairment  Fund of Knowledge: intact with awareness of current and past events    ED Course    "     Labs Ordered and Resulted from Time of ED Arrival to Time of ED Departure   COVID-19 VIRUS (CORONAVIRUS) BY PCR - Normal       Result Value    SARS CoV2 PCR Negative         Assessments & Plan (with Medical Decision Making)   Patient presenting with increased anxiety with panic in the context of generalized anxiety disorder further complicated by psychosocial stressors and decrease in antianxiety medication. Ativan given in ED. Patient feeling better as anxiety level diminished.    The Prescription Drug Monitoring Program (PDMP) database was accessed to verify accuracy of patients prescribed controlled substances.    Nursing notes reviewed noting no acute issues.     I have reviewed the assessment completed by the [facility].     After a period of working with the treatment team on the [facility] unit, the patient's mental state improved to allow a safe transition to outpatient care. After counseling on the diagnosis, work-up, and treatment plan, the patient was discharged. Close follow-up with a psychiatrist and/or therapist was recommended and community psychiatric resources were provided. Patient is to return to the ED if any urgent or potentially life-threatening concerns.     At the time of discharge, the patient's acute suicide risk was determined to be low due to the following factors: Reduction in the intensity of mood/anxiety symptoms that preceded the admission, denial of suicidal thoughts, denies feeling helpless or helpless, not currently under the influence of alcohol or illicit substances, denies experiencing command hallucinations, no immediate access to firearms. The patient's acute risk could be higher if noncompliant with their treatment plan, medications, follow-up appointments or using illicit substances or alcohol. Protective factors include: social supports, children, stable housing, employment.    Preliminary diagnosis:    ICD-10-CM    1. Anxiety attack  F41.0       2. Depression, unspecified  Patient fall depression type  F32.A            Treatment Plan:  -discharge home with safety plan in place.  -increase zoloft from 100 mg to 150 mg nightly. Give increased dose on unit prior to discharge.  -Start seroquel 50 mg nightly for sleep, anxiety and paranoia. Give first dose on unit prior to discharge home.  -Medication education provided this visit includes, rationale for medication, importance of compliance, medication interactions, and common side effects. Patient agreeable.  -follow up with established psychiatric provider.  -referral for individual therapy.  -continue to use Trazodone as needed for sleep.    --  GLADYS Thomas CNP   [facility] EMERGENCY DEPT  [facility] Unit  2/16/2023      Modesta Ashley APRN CNP  02/16/23 0482     Patient was in the bathroom, she was changing and attempted to  her sanitary pad from the floor. Pt then lost her balance and fell back and hit her back against shower wall.

## 2023-05-01 NOTE — OB RN TRIAGE NOTE - ABORTIONS, OB PROFILE
Impression: S/P YAG - posterior capsulotomy OS - 5 Days. Other specified postprocedural states  Z98.890. OD:03/29/2023  OS:04/26/2023 PCIOL centered and clear. Plan: Patient doing well. Patient to use Prednisolone BID for 1 week, then QD for x 1 week. (Rx resent today.) Patient to continue the use of Systane QID OU and hot Compresses BID OU. RTC as scheduled on 5/24/2023 for reevaluation with Dr. Cait Jarvis.
0

## 2023-08-30 PROBLEM — Z78.9 OTHER SPECIFIED HEALTH STATUS: Chronic | Status: ACTIVE | Noted: 2019-12-18

## 2023-09-08 ENCOUNTER — APPOINTMENT (OUTPATIENT)
Dept: ANTEPARTUM | Facility: CLINIC | Age: 43
End: 2023-09-08
Payer: COMMERCIAL

## 2023-09-08 ENCOUNTER — ASOB RESULT (OUTPATIENT)
Age: 43
End: 2023-09-08

## 2023-09-08 PROCEDURE — 76813 OB US NUCHAL MEAS 1 GEST: CPT

## 2023-09-08 PROCEDURE — 36416 COLLJ CAPILLARY BLOOD SPEC: CPT

## 2023-09-08 PROCEDURE — 76801 OB US < 14 WKS SINGLE FETUS: CPT

## 2023-09-11 PROBLEM — Z00.00 ENCOUNTER FOR PREVENTIVE HEALTH EXAMINATION: Status: ACTIVE | Noted: 2023-09-11

## 2023-10-20 ENCOUNTER — ASOB RESULT (OUTPATIENT)
Age: 43
End: 2023-10-20

## 2023-10-20 ENCOUNTER — APPOINTMENT (OUTPATIENT)
Dept: ANTEPARTUM | Facility: CLINIC | Age: 43
End: 2023-10-20
Payer: COMMERCIAL

## 2023-10-20 PROCEDURE — 76811 OB US DETAILED SNGL FETUS: CPT

## 2023-11-06 ENCOUNTER — APPOINTMENT (OUTPATIENT)
Dept: ANTEPARTUM | Facility: CLINIC | Age: 43
End: 2023-11-06
Payer: COMMERCIAL

## 2023-11-06 ENCOUNTER — ASOB RESULT (OUTPATIENT)
Age: 43
End: 2023-11-06

## 2023-11-06 PROCEDURE — 76816 OB US FOLLOW-UP PER FETUS: CPT

## 2023-12-12 ENCOUNTER — ASOB RESULT (OUTPATIENT)
Age: 43
End: 2023-12-12

## 2023-12-12 ENCOUNTER — APPOINTMENT (OUTPATIENT)
Dept: MATERNAL FETAL MEDICINE | Facility: CLINIC | Age: 43
End: 2023-12-12

## 2023-12-12 ENCOUNTER — APPOINTMENT (OUTPATIENT)
Dept: MATERNAL FETAL MEDICINE | Facility: CLINIC | Age: 43
End: 2023-12-12
Payer: COMMERCIAL

## 2023-12-12 DIAGNOSIS — O24.419 GESTATIONAL DIABETES MELLITUS IN PREGNANCY, UNSPECIFIED CONTROL: ICD-10-CM

## 2023-12-12 PROCEDURE — G0108 DIAB MANAGE TRN  PER INDIV: CPT

## 2023-12-22 ENCOUNTER — APPOINTMENT (OUTPATIENT)
Dept: MATERNAL FETAL MEDICINE | Facility: CLINIC | Age: 43
End: 2023-12-22

## 2023-12-22 ENCOUNTER — APPOINTMENT (OUTPATIENT)
Dept: ANTEPARTUM | Facility: CLINIC | Age: 43
End: 2023-12-22

## 2023-12-29 ENCOUNTER — NON-APPOINTMENT (OUTPATIENT)
Age: 43
End: 2023-12-29

## 2024-02-20 ENCOUNTER — APPOINTMENT (OUTPATIENT)
Dept: ANTEPARTUM | Facility: CLINIC | Age: 44
End: 2024-02-20
Payer: COMMERCIAL

## 2024-02-20 ENCOUNTER — TRANSCRIPTION ENCOUNTER (OUTPATIENT)
Age: 44
End: 2024-02-20

## 2024-02-20 ENCOUNTER — ASOB RESULT (OUTPATIENT)
Age: 44
End: 2024-02-20

## 2024-02-20 ENCOUNTER — INPATIENT (INPATIENT)
Facility: HOSPITAL | Age: 44
LOS: 1 days | Discharge: HOME | End: 2024-02-22
Attending: OBSTETRICS & GYNECOLOGY | Admitting: OBSTETRICS & GYNECOLOGY

## 2024-02-20 VITALS
DIASTOLIC BLOOD PRESSURE: 56 MMHG | SYSTOLIC BLOOD PRESSURE: 111 MMHG | HEART RATE: 90 BPM | RESPIRATION RATE: 17 BRPM | TEMPERATURE: 98 F

## 2024-02-20 DIAGNOSIS — Z34.90 ENCOUNTER FOR SUPERVISION OF NORMAL PREGNANCY, UNSPECIFIED, UNSPECIFIED TRIMESTER: ICD-10-CM

## 2024-02-20 DIAGNOSIS — O36.8390 MATERNAL CARE FOR ABNORMALITIES OF THE FETAL HEART RATE OR RHYTHM, UNSPECIFIED TRIMESTER, NOT APPLICABLE OR UNSPECIFIED: ICD-10-CM

## 2024-02-20 DIAGNOSIS — O24.419 GESTATIONAL DIABETES MELLITUS IN PREGNANCY, UNSPECIFIED CONTROL: ICD-10-CM

## 2024-02-20 DIAGNOSIS — O26.899 OTHER SPECIFIED PREGNANCY RELATED CONDITIONS, UNSPECIFIED TRIMESTER: ICD-10-CM

## 2024-02-20 LAB
BASOPHILS # BLD AUTO: 0.02 K/UL — SIGNIFICANT CHANGE UP (ref 0–0.2)
BASOPHILS NFR BLD AUTO: 0.3 % — SIGNIFICANT CHANGE UP (ref 0–2)
BLD GP AB SCN SERPL QL: NEGATIVE — SIGNIFICANT CHANGE UP
EOSINOPHIL # BLD AUTO: 0.04 K/UL — SIGNIFICANT CHANGE UP (ref 0–0.5)
EOSINOPHIL NFR BLD AUTO: 0.6 % — SIGNIFICANT CHANGE UP (ref 0–6)
GLUCOSE BLDC GLUCOMTR-MCNC: 88 MG/DL — SIGNIFICANT CHANGE UP (ref 70–99)
GLUCOSE BLDC GLUCOMTR-MCNC: 92 MG/DL — SIGNIFICANT CHANGE UP (ref 70–99)
GLUCOSE BLDC GLUCOMTR-MCNC: 99 MG/DL — SIGNIFICANT CHANGE UP (ref 70–99)
HCT VFR BLD CALC: 39.1 % — SIGNIFICANT CHANGE UP (ref 34.5–45)
HGB BLD-MCNC: 13.7 G/DL — SIGNIFICANT CHANGE UP (ref 11.5–15.5)
IANC: 5.43 K/UL — SIGNIFICANT CHANGE UP (ref 1.8–7.4)
IMM GRANULOCYTES NFR BLD AUTO: 1.1 % — HIGH (ref 0–0.9)
LYMPHOCYTES # BLD AUTO: 1.16 K/UL — SIGNIFICANT CHANGE UP (ref 1–3.3)
LYMPHOCYTES # BLD AUTO: 16.1 % — SIGNIFICANT CHANGE UP (ref 13–44)
MCHC RBC-ENTMCNC: 32.6 PG — SIGNIFICANT CHANGE UP (ref 27–34)
MCHC RBC-ENTMCNC: 35 GM/DL — SIGNIFICANT CHANGE UP (ref 32–36)
MCV RBC AUTO: 93.1 FL — SIGNIFICANT CHANGE UP (ref 80–100)
MONOCYTES # BLD AUTO: 0.47 K/UL — SIGNIFICANT CHANGE UP (ref 0–0.9)
MONOCYTES NFR BLD AUTO: 6.5 % — SIGNIFICANT CHANGE UP (ref 2–14)
NEUTROPHILS # BLD AUTO: 5.43 K/UL — SIGNIFICANT CHANGE UP (ref 1.8–7.4)
NEUTROPHILS NFR BLD AUTO: 75.4 % — SIGNIFICANT CHANGE UP (ref 43–77)
NRBC # BLD: 0 /100 WBCS — SIGNIFICANT CHANGE UP (ref 0–0)
NRBC # FLD: 0 K/UL — SIGNIFICANT CHANGE UP (ref 0–0)
PLATELET # BLD AUTO: 244 K/UL — SIGNIFICANT CHANGE UP (ref 150–400)
RBC # BLD: 4.2 M/UL — SIGNIFICANT CHANGE UP (ref 3.8–5.2)
RBC # FLD: 13.6 % — SIGNIFICANT CHANGE UP (ref 10.3–14.5)
RH IG SCN BLD-IMP: POSITIVE — SIGNIFICANT CHANGE UP
RH IG SCN BLD-IMP: POSITIVE — SIGNIFICANT CHANGE UP
WBC # BLD: 7.2 K/UL — SIGNIFICANT CHANGE UP (ref 3.8–10.5)
WBC # FLD AUTO: 7.2 K/UL — SIGNIFICANT CHANGE UP (ref 3.8–10.5)

## 2024-02-20 PROCEDURE — 76815 OB US LIMITED FETUS(S): CPT | Mod: 26

## 2024-02-20 RX ORDER — SODIUM CHLORIDE 9 MG/ML
1000 INJECTION, SOLUTION INTRAVENOUS
Refills: 0 | Status: DISCONTINUED | OUTPATIENT
Start: 2024-02-20 | End: 2024-02-21

## 2024-02-20 RX ORDER — CHLORHEXIDINE GLUCONATE 213 G/1000ML
1 SOLUTION TOPICAL DAILY
Refills: 0 | Status: DISCONTINUED | OUTPATIENT
Start: 2024-02-20 | End: 2024-02-21

## 2024-02-20 RX ORDER — AMPICILLIN TRIHYDRATE 250 MG
1 CAPSULE ORAL EVERY 4 HOURS
Refills: 0 | Status: DISCONTINUED | OUTPATIENT
Start: 2024-02-20 | End: 2024-02-21

## 2024-02-20 RX ORDER — OXYTOCIN 10 UNIT/ML
VIAL (ML) INJECTION
Qty: 30 | Refills: 0 | Status: DISCONTINUED | OUTPATIENT
Start: 2024-02-20 | End: 2024-02-21

## 2024-02-20 RX ORDER — SODIUM CHLORIDE 9 MG/ML
1000 INJECTION INTRAMUSCULAR; INTRAVENOUS; SUBCUTANEOUS
Refills: 0 | Status: DISCONTINUED | OUTPATIENT
Start: 2024-02-20 | End: 2024-02-21

## 2024-02-20 RX ORDER — INFLUENZA VIRUS VACCINE 15; 15; 15; 15 UG/.5ML; UG/.5ML; UG/.5ML; UG/.5ML
0.5 SUSPENSION INTRAMUSCULAR ONCE
Refills: 0 | Status: DISCONTINUED | OUTPATIENT
Start: 2024-02-20 | End: 2024-02-22

## 2024-02-20 RX ORDER — AMPICILLIN TRIHYDRATE 250 MG
2 CAPSULE ORAL ONCE
Refills: 0 | Status: COMPLETED | OUTPATIENT
Start: 2024-02-20 | End: 2024-02-20

## 2024-02-20 RX ORDER — OXYTOCIN 10 UNIT/ML
VIAL (ML) INJECTION
Qty: 20 | Refills: 0 | Status: DISCONTINUED | OUTPATIENT
Start: 2024-02-20 | End: 2024-02-21

## 2024-02-20 RX ORDER — SODIUM CHLORIDE 9 MG/ML
1000 INJECTION, SOLUTION INTRAVENOUS ONCE
Refills: 0 | Status: COMPLETED | OUTPATIENT
Start: 2024-02-20 | End: 2024-02-20

## 2024-02-20 RX ADMIN — Medication 108 GRAM(S): at 21:30

## 2024-02-20 RX ADMIN — Medication 2 MILLIUNIT(S)/MIN: at 15:12

## 2024-02-20 RX ADMIN — Medication 108 GRAM(S): at 17:46

## 2024-02-20 RX ADMIN — SODIUM CHLORIDE 125 MILLILITER(S): 9 INJECTION INTRAMUSCULAR; INTRAVENOUS; SUBCUTANEOUS at 20:07

## 2024-02-20 RX ADMIN — Medication 200 GRAM(S): at 13:28

## 2024-02-20 RX ADMIN — CHLORHEXIDINE GLUCONATE 1 APPLICATION(S): 213 SOLUTION TOPICAL at 13:29

## 2024-02-20 RX ADMIN — SODIUM CHLORIDE 1000 MILLILITER(S): 9 INJECTION, SOLUTION INTRAVENOUS at 12:08

## 2024-02-20 RX ADMIN — SODIUM CHLORIDE 125 MILLILITER(S): 9 INJECTION, SOLUTION INTRAVENOUS at 20:07

## 2024-02-20 NOTE — OB PROVIDER TRIAGE NOTE - HISTORY OF PRESENT ILLNESS
42 y/o  @ 37.3 wks gestation presents from P with a NRNST in the office , pt was on NST and had 1 late decel as per Dr Madison and had BPP and was  denies any uc's vb or lof reports +FM denies any n/v/d denies any fever or chills ap care comp by :   JESUS   GBS- +   GDMA1   scheduled IOL 3/2/2024

## 2024-02-20 NOTE — OB PROVIDER H&P - ASSESSMENT
42 y/o  @ 37.3 wks gestation category 2 FHT/ GDMA1/ GBS- + for Pitocin IOL   plan of care d/w dr Mesa  admit to l&D  IOL @ 37.3 wks gestation for Pitocin IOL secondary to Category 2 FHT / GBS- + / Ampicillin   see admission orders

## 2024-02-20 NOTE — OB PROVIDER TRIAGE NOTE - NSHPPHYSICALEXAM_GEN_ALL_CORE
abdomen: soft, nt on palp  NRNST   FH baseline 150 FH variability mod +FH 10 bpm accels FH prolonged decl x's 2   toco: irregular   T(C): 36.8 (02-20-24 @ 11:57), Max: 36.8 (02-20-24 @ 11:54)  HR: 82 (02-20-24 @ 12:32) (77 - 96)  BP: 111/56 (02-20-24 @ 12:00) (111/56 - 111/56)  RR: 17 (02-20-24 @ 11:54) (17 - 17)  SpO2: 98% (02-20-24 @ 12:32) (96% - 98%)  sono images saved in ASOB  sono reports saved in ASOB   TAS: vtx posterior MVP: 4.33 FH- 154 bpm   EFW: 2892 grams   SVE: 2.5/60/-3

## 2024-02-20 NOTE — OB PROVIDER LABOR PROGRESS NOTE - ASSESSMENT
Performed by Nestor
A/P: 44y/o  @37w3d IOL for cat 2  - Labor: pit@3p  - Fetus: cat 2   - GBS: +, Amp  - Pain: epidural, well controlled.     Patient with cat 2 tracing recurrent variables followed by 2 min FHR decel. Repositioned and VE. Made good cervical change to 4/80/-2. Patient repositioned and now 150/min/-accels/-decels. Category 2 however no further decels at this time. Will continue to monitor and hold pitocin at 12u/h for now.     Marianne Argueta, PGY1  d/w Dr. Ramirez

## 2024-02-20 NOTE — OB RN TRIAGE NOTE - FALL HARM RISK - UNIVERSAL INTERVENTIONS
Bed in lowest position, wheels locked, appropriate side rails in place/Call bell, personal items and telephone in reach/Instruct patient to call for assistance before getting out of bed or chair/Non-slip footwear when patient is out of bed/[place] to call system/Physically safe environment - no spills, clutter or unnecessary equipment/Purposeful Proactive Rounding/Room/bathroom lighting operational, light cord in reach

## 2024-02-20 NOTE — OB PROVIDER H&P - HISTORY OF PRESENT ILLNESS
44 y/o  @ 37.3 wks gestation presents from P with a NRNST in the office , pt was on NST and had 1 late decel as per Dr Madison and had BPP and was  denies any uc's vb or lof reports +FM denies any n/v/d denies any fever or chills ap care comp by :   JESUS   GBS- +   GDMA1   scheduled IOL 3/2/2024

## 2024-02-20 NOTE — OB PROVIDER LABOR PROGRESS NOTE - NS_SUBJECTIVE/OBJECTIVE_OBGYN_ALL_OB_FT
R1 OB Labor Note    S: Patient seen and examined at bedside.     T(C): 36.5 (02-20-24 @ 19:15), Max: 36.9 (02-20-24 @ 13:57)  HR: 83 (02-20-24 @ 19:58) (70 - 103)  BP: 129/69 (02-20-24 @ 19:58) (93/53 - 194/130)  BP(mean): --  ABP: --  ABP(mean): --  RR: 17 (02-20-24 @ 19:15) (17 - 18)  SpO2: 96% (02-20-24 @ 19:58) (94% - 100%)  Wt(kg): --  CVP(mm Hg): --  CI: --  CAPILLARY BLOOD GLUCOSE      POCT Blood Glucose.: 92 mg/dL (20 Feb 2024 15:58)  POCT Blood Glucose.: 88 mg/dL (20 Feb 2024 12:17)   N/A      02-20 @ 07:01  -  02-20 @ 20:00  --------------------------------------------------------  IN:    dextrose 5% + sodium chloride 0.9%: 875 mL  Total IN: 875 mL    OUT:    Indwelling Catheter - Urethral (mL): 400 mL  Total OUT: 400 mL    Total NET: 475 mL

## 2024-02-20 NOTE — OB PROVIDER TRIAGE NOTE - NSOBPROVIDERNOTE_OBGYN_ALL_OB_FT
44 y/o  @ 37.3 wks gestation category 2 FHT/ GDMA1/ GBS- + for Pitocin IOL   plan of care d/w dr Mesa  admit to l&D  IOL @ 37.3 wks gestation for Pitocin IOL secondary to Category 2 FHT / GBS- + / Ampicillin   see admission orders

## 2024-02-20 NOTE — CHART NOTE - NSCHARTNOTEFT_GEN_A_CORE
Late Entry Note (20/20/24 10:30pm)  Patient evaluated at bedside and found stable. VE 4/70/-3, offered AROM, discussed risks versus benefits. Patient provided verbal consent. AROM done without complications.

## 2024-02-21 LAB
GLUCOSE BLDC GLUCOMTR-MCNC: 102 MG/DL — HIGH (ref 70–99)
RUBV IGG SER-ACNC: 5 INDEX — SIGNIFICANT CHANGE UP
RUBV IGG SER-IMP: POSITIVE — SIGNIFICANT CHANGE UP
T PALLIDUM AB TITR SER: NEGATIVE — SIGNIFICANT CHANGE UP

## 2024-02-21 RX ORDER — HYDROCORTISONE 1 %
1 OINTMENT (GRAM) TOPICAL EVERY 6 HOURS
Refills: 0 | Status: DISCONTINUED | OUTPATIENT
Start: 2024-02-21 | End: 2024-02-22

## 2024-02-21 RX ORDER — SODIUM CHLORIDE 9 MG/ML
3 INJECTION INTRAMUSCULAR; INTRAVENOUS; SUBCUTANEOUS EVERY 8 HOURS
Refills: 0 | Status: DISCONTINUED | OUTPATIENT
Start: 2024-02-21 | End: 2024-02-22

## 2024-02-21 RX ORDER — OXYTOCIN 10 UNIT/ML
41.67 VIAL (ML) INJECTION
Qty: 20 | Refills: 0 | Status: DISCONTINUED | OUTPATIENT
Start: 2024-02-21 | End: 2024-02-21

## 2024-02-21 RX ORDER — AER TRAVELER 0.5 G/1
1 SOLUTION RECTAL; TOPICAL EVERY 4 HOURS
Refills: 0 | Status: DISCONTINUED | OUTPATIENT
Start: 2024-02-21 | End: 2024-02-22

## 2024-02-21 RX ORDER — OXYCODONE HYDROCHLORIDE 5 MG/1
5 TABLET ORAL
Refills: 0 | Status: DISCONTINUED | OUTPATIENT
Start: 2024-02-21 | End: 2024-02-22

## 2024-02-21 RX ORDER — PRAMOXINE HYDROCHLORIDE 150 MG/15G
1 AEROSOL, FOAM RECTAL EVERY 4 HOURS
Refills: 0 | Status: DISCONTINUED | OUTPATIENT
Start: 2024-02-21 | End: 2024-02-22

## 2024-02-21 RX ORDER — TETANUS TOXOID, REDUCED DIPHTHERIA TOXOID AND ACELLULAR PERTUSSIS VACCINE, ADSORBED 5; 2.5; 8; 8; 2.5 [IU]/.5ML; [IU]/.5ML; UG/.5ML; UG/.5ML; UG/.5ML
0.5 SUSPENSION INTRAMUSCULAR ONCE
Refills: 0 | Status: DISCONTINUED | OUTPATIENT
Start: 2024-02-21 | End: 2024-02-22

## 2024-02-21 RX ORDER — SIMETHICONE 80 MG/1
80 TABLET, CHEWABLE ORAL EVERY 4 HOURS
Refills: 0 | Status: DISCONTINUED | OUTPATIENT
Start: 2024-02-21 | End: 2024-02-22

## 2024-02-21 RX ORDER — BENZOCAINE 10 %
1 GEL (GRAM) MUCOUS MEMBRANE EVERY 6 HOURS
Refills: 0 | Status: DISCONTINUED | OUTPATIENT
Start: 2024-02-21 | End: 2024-02-22

## 2024-02-21 RX ORDER — ACETAMINOPHEN 500 MG
650 TABLET ORAL EVERY 6 HOURS
Refills: 0 | Status: DISCONTINUED | OUTPATIENT
Start: 2024-02-21 | End: 2024-02-22

## 2024-02-21 RX ORDER — DIBUCAINE 1 %
1 OINTMENT (GRAM) RECTAL EVERY 6 HOURS
Refills: 0 | Status: DISCONTINUED | OUTPATIENT
Start: 2024-02-21 | End: 2024-02-22

## 2024-02-21 RX ORDER — ACETAMINOPHEN 500 MG
1000 TABLET ORAL ONCE
Refills: 0 | Status: DISCONTINUED | OUTPATIENT
Start: 2024-02-21 | End: 2024-02-21

## 2024-02-21 RX ORDER — OXYCODONE HYDROCHLORIDE 5 MG/1
5 TABLET ORAL ONCE
Refills: 0 | Status: DISCONTINUED | OUTPATIENT
Start: 2024-02-21 | End: 2024-02-22

## 2024-02-21 RX ORDER — ACETAMINOPHEN 500 MG
975 TABLET ORAL EVERY 6 HOURS
Refills: 0 | Status: DISCONTINUED | OUTPATIENT
Start: 2024-02-21 | End: 2024-02-21

## 2024-02-21 RX ORDER — LANOLIN
1 OINTMENT (GRAM) TOPICAL EVERY 6 HOURS
Refills: 0 | Status: DISCONTINUED | OUTPATIENT
Start: 2024-02-21 | End: 2024-02-22

## 2024-02-21 RX ORDER — OXYTOCIN 10 UNIT/ML
20 VIAL (ML) INJECTION ONCE
Refills: 0 | Status: COMPLETED | OUTPATIENT
Start: 2024-02-21 | End: 2024-02-21

## 2024-02-21 RX ORDER — DIPHENHYDRAMINE HCL 50 MG
25 CAPSULE ORAL EVERY 6 HOURS
Refills: 0 | Status: DISCONTINUED | OUTPATIENT
Start: 2024-02-21 | End: 2024-02-22

## 2024-02-21 RX ORDER — ACETAMINOPHEN 500 MG
975 TABLET ORAL EVERY 6 HOURS
Refills: 0 | Status: COMPLETED | OUTPATIENT
Start: 2024-02-21 | End: 2025-01-19

## 2024-02-21 RX ORDER — MAGNESIUM HYDROXIDE 400 MG/1
30 TABLET, CHEWABLE ORAL
Refills: 0 | Status: DISCONTINUED | OUTPATIENT
Start: 2024-02-21 | End: 2024-02-22

## 2024-02-21 RX ADMIN — Medication 1 TABLET(S): at 14:52

## 2024-02-21 RX ADMIN — Medication 125 MILLIUNIT(S)/MIN: at 02:32

## 2024-02-21 RX ADMIN — Medication 650 MILLIGRAM(S): at 21:25

## 2024-02-21 RX ADMIN — Medication 20 UNIT(S): at 01:24

## 2024-02-21 RX ADMIN — Medication 1 APPLICATION(S): at 22:47

## 2024-02-21 RX ADMIN — SODIUM CHLORIDE 3 MILLILITER(S): 9 INJECTION INTRAMUSCULAR; INTRAVENOUS; SUBCUTANEOUS at 06:05

## 2024-02-21 RX ADMIN — Medication 650 MILLIGRAM(S): at 12:30

## 2024-02-21 RX ADMIN — SODIUM CHLORIDE 3 MILLILITER(S): 9 INJECTION INTRAMUSCULAR; INTRAVENOUS; SUBCUTANEOUS at 14:52

## 2024-02-21 RX ADMIN — Medication 650 MILLIGRAM(S): at 20:49

## 2024-02-21 RX ADMIN — SODIUM CHLORIDE 3 MILLILITER(S): 9 INJECTION INTRAMUSCULAR; INTRAVENOUS; SUBCUTANEOUS at 21:27

## 2024-02-21 RX ADMIN — Medication 650 MILLIGRAM(S): at 13:30

## 2024-02-21 NOTE — OB PROVIDER DELIVERY SUMMARY - NSLOWPPHRISK_OBGYN_A_OB
No previous uterine incision/[place] Pregnancy/Less than or equal to 4 previous vaginal births/No known bleeding disorder/No history of postpartum hemorrhage/No other PPH risks indicated

## 2024-02-21 NOTE — OB PROVIDER DELIVERY SUMMARY - NSPROVIDERDELIVERYNOTE_OBGYN_ALL_OB_FT
Patient was fully dilated and pushing. Fetal head was CONSUELO and head delivered over RML without complication. Compound hand noted. The anterior and posterior shoulders delivered, followed by the remaining body atraumatically.  emerged vigorous and crying. Delayed cord clamping was performed, and then clamped and cut. Cord blood gases collected x2. The  was placed skin to skin with mother. The placenta delivered intact with membranes. Pitocin was administered. Uterus initially noted to be atonic so 20u extra pitocin administered via IV fluids and uterus massaged bimanually. Fundus then found to be firm. Cervix, vagina and perineum inspected revealing RML & a separate 2nd degree laceration in L aspect of pernieum, repaired using 2-0 chromic in the usual fashion with good hemostasis.     Viable male infant delivered, with APGARs 8/9    Laceration: RML & 2nd degree      Dr. Ramirez present for the delivery  Dinorah CHILDS

## 2024-02-21 NOTE — DISCHARGE NOTE OB - CARE PROVIDERS DIRECT ADDRESSES
[email or web address]

## 2024-02-21 NOTE — DISCHARGE NOTE OB - MEDICATION SUMMARY - MEDICATIONS TO TAKE
I will START or STAY ON the medications listed below when I get home from the hospital:    PNV Prenatal oral tablet  -- 1 tab(s) by mouth once a day  -- Indication: For Labor and delivery, indication for care   I will START or STAY ON the medications listed below when I get home from the hospital:    acetaminophen 325 mg oral tablet  -- 2 tab(s) by mouth every 6 hours as needed for  moderate pain  -- Indication: For Labor and delivery, indication for care    PNV Prenatal oral tablet  -- 1 tab(s) by mouth once a day  -- Indication: For Labor and delivery, indication for care

## 2024-02-21 NOTE — OB PROVIDER DELIVERY SUMMARY - NSSELHIDDEN_OBGYN_ALL_OB_FT
[NS_DeliveryAttending1_OBGYN_ALL_OB_FT:[id number]=],[NS_DeliveryAssist1_OBGYN_ALL_OB_FT:[id number]=]

## 2024-02-21 NOTE — DISCHARGE NOTE OB - CARE PLAN
1 Principal Discharge DX:	 (spontaneous vaginal delivery)  Assessment and plan of treatment:	, No complications  Follow up at office in 6 weeks

## 2024-02-21 NOTE — OB RN DELIVERY SUMMARY - NSSELHIDDEN_OBGYN_ALL_OB_FT
[NS_DeliveryAttending1_OBGYN_ALL_OB_FT:[id number]=],[NS_DeliveryRN_OBGYN_ALL_OB_FT:[id number]=]

## 2024-02-21 NOTE — DISCHARGE NOTE OB - NS MD DC FALL RISK RISK
For information on Fall & Injury Prevention, visit: [email or web address] OR  [email or web address] OR  https://www.cdc.gov/steadi/patient.html

## 2024-02-21 NOTE — DISCHARGE NOTE OB - PATIENT PORTAL LINK FT
You can access the FollowMyHealth Patient Portal offered by [facility] by registering at the following website: [email or web address]. By joining [employer]’s FollowMyHealth portal, you will also be able to view your health information using other applications (apps) compatible with our system.

## 2024-02-21 NOTE — OB NEONATOLOGY/PEDIATRICIAN DELIVERY SUMMARY - NSPEDSNEONOTESA_OBGYN_ALL_OB_FT
Pediatrician called to delivery for category II tracings. Male infant born at 37+3/7 wks via  to a 40 y/o  blood type O+ mother. Maternal history of GDMA1, post-partum depression with prior pregnancy. No significant prenatal history. Prenatal labs nr/-, rubella pending, GBS + on , treated with ampicillin x3.  AROM at 22:35 on  with clear fluids. EOS score 0.06, highest maternal temperature 36.9. Baby emerged vigorous, crying. Cord clamping delayed 60 sec. Infant was brought to radiant warmer and warmed, dried, stimulated and suctioned. HR>100, normal respiratory effort. APGARS of 8/9. Mom is initiating breast feeding. Consents to Hepatitis B vaccination. Desires for infant to be circumcised. Admitted under Dr. Abraham.     BW: 2990, AGA  : 24  TOB: 1:19 AM

## 2024-02-21 NOTE — DISCHARGE NOTE OB - CARE PROVIDER_API CALL
Luis Antonio Peralta  Obstetrics and Gynecology  [address] [place] [id number]  Phone: [phone]  Fax: [phone]  Follow Up Time: 1 month

## 2024-02-21 NOTE — OB RN DELIVERY SUMMARY - NS_SEPSISRSKCALC_OBGYN_ALL_OB_FT
EOS calculated successfully. EOS Risk Factor: 0.5/1000 live births ([facility] national incidence); GA=37w4d; Temp=98.42; ROM=2.733; GBS='Positive'; Antibiotics='GBS specific antibiotics > 2 hrs prior to birth'

## 2024-02-22 VITALS
HEART RATE: 68 BPM | TEMPERATURE: 98 F | OXYGEN SATURATION: 99 % | SYSTOLIC BLOOD PRESSURE: 100 MMHG | DIASTOLIC BLOOD PRESSURE: 54 MMHG | RESPIRATION RATE: 17 BRPM

## 2024-02-22 RX ORDER — ACETAMINOPHEN 500 MG
2 TABLET ORAL
Qty: 0 | Refills: 0 | DISCHARGE
Start: 2024-02-22

## 2024-02-22 RX ADMIN — Medication 650 MILLIGRAM(S): at 11:51

## 2024-02-22 RX ADMIN — Medication 650 MILLIGRAM(S): at 03:35

## 2024-02-22 RX ADMIN — Medication 650 MILLIGRAM(S): at 02:57

## 2024-02-22 RX ADMIN — Medication 650 MILLIGRAM(S): at 17:04

## 2024-02-22 RX ADMIN — Medication 650 MILLIGRAM(S): at 17:15

## 2024-02-22 RX ADMIN — Medication 650 MILLIGRAM(S): at 10:51

## 2024-02-22 NOTE — PROGRESS NOTE ADULT - ASSESSMENT
NP:  day 1 Progress Note:     Patient seen at bedside resting comfortably offers no current complaints. Ambulating and voiding without difficulty.  Passing flatus and tolerating regular diet.  Bonding well with .  Breastfeeding exclusively . Denies HA, CP, SOB, N/V/D, dizziness, palpitations,  worsening vaginal bleeding, or any other concerns.      Vital Signs Last 24 Hrs  T(C): 36.8 (2024 05:28), Max: 37 (2024 14:32)  T(F): 98.2 (2024 05:28), Max: 98.6 (2024 14:32)  HR: 71 (2024 05:28) (71 - 89)  BP: 98/54 (2024 05:28) (96/57 - 113/61)  BP(mean): --  RR: 18 (2024 05:28) (17 - 18)  SpO2: 99% (2024 05:28) (99% - 99%)    Parameters below as of 2024 05:28  Patient On (Oxygen Delivery Method): room air        Physical Exam:     Gen: A&Ox 3, NAD  Chest: CTA B/L  Cardiac: S1,S2; RRR  Breast: Soft, nontender, nonengorged  Abdomen: +BS, Soft, nontender, ND; Fundus firm below umbilicus  Gyn: mod lochia, intact/repaired  Ext: Nontender, DTRS 2+, no worsening edema                          13.7   7.20  )-----------( 244      ( 2024 12:55 )             39.1       A/P: 43yr old F PPD#1 s/p  2024 c/b liable BPs     - Meeting PP milestones   - Does NOT meet criteria for GHTN or PEC   - BP cuff sent to home pharmacy for BP monitoring   - PEC resources provided. Red flags and when to seek medical attn.    - Continue pain management  - Discharge home with instructions   - Pt to follow up in 6 weeks for PP visit or as needed     - d/w dr Annamaria Crenshaw NP

## 2024-02-23 ENCOUNTER — NON-APPOINTMENT (OUTPATIENT)
Age: 44
End: 2024-02-23

## 2024-02-26 ENCOUNTER — NON-APPOINTMENT (OUTPATIENT)
Age: 44
End: 2024-02-26

## 2024-02-26 DIAGNOSIS — F41.9 ANXIETY DISORDER, UNSPECIFIED: ICD-10-CM

## 2024-02-26 RX ORDER — BLOOD-GLUCOSE METER
KIT MISCELLANEOUS 4 TIMES DAILY
Qty: 2 | Refills: 2 | Status: DISCONTINUED | COMMUNITY
Start: 2023-12-12 | End: 2024-02-26

## 2024-02-26 RX ORDER — BLOOD-GLUCOSE METER
W/DEVICE KIT MISCELLANEOUS
Qty: 1 | Refills: 0 | Status: DISCONTINUED | COMMUNITY
Start: 2023-12-12 | End: 2024-02-26

## 2024-02-26 RX ORDER — LANCETS 33 GAUGE
EACH MISCELLANEOUS
Qty: 4 | Refills: 2 | Status: DISCONTINUED | COMMUNITY
Start: 2023-12-12 | End: 2024-02-26

## 2024-02-26 RX ORDER — PRENATAL VIT NO.126/IRON/FOLIC 28MG-0.8MG
28-0.8 TABLET ORAL DAILY
Refills: 0 | Status: ACTIVE | COMMUNITY
Start: 2024-02-26

## 2024-02-26 RX ORDER — URINE ACETONE TEST STRIPS
STRIP MISCELLANEOUS
Qty: 1 | Refills: 2 | Status: DISCONTINUED | COMMUNITY
Start: 2023-12-12 | End: 2024-02-26

## 2024-03-05 ENCOUNTER — NON-APPOINTMENT (OUTPATIENT)
Age: 44
End: 2024-03-05

## 2024-03-12 ENCOUNTER — NON-APPOINTMENT (OUTPATIENT)
Age: 44
End: 2024-03-12

## 2024-07-16 NOTE — OB PROVIDER H&P - NSRISKFACTORSDETA_OBGYN_ALL_OB
The skin at the access site was anesthetized. Using an angiocath with the Seldinger technique and ultrasound (musiXmatch) the right radial artery was succesfully accessed in a retrograde fashion over the guidewire using a KIT INTRO 6FR 21GA 10CM 45CM .021IN 35MM FLEX STRGT SHTH DIL. Ultrasound found the vessel was patent. A permanent recording was saved. Gestational Diabetes

## 2025-03-05 NOTE — OB RN PATIENT PROFILE - BREAST MILK SUPPORTS STABLE NEWBORN BLOOD SUGAR
Verify source of procedure(s): Office visit  TIME OUT-CONFIRM CORRECT PROCEDURE: egd/colonoscopy  Cardiology: no  Pulmonology: no  Blood thinner: no  GLP-1: Ozempic     Statement Selected